# Patient Record
Sex: MALE | Race: ASIAN | NOT HISPANIC OR LATINO | Employment: UNEMPLOYED | URBAN - METROPOLITAN AREA
[De-identification: names, ages, dates, MRNs, and addresses within clinical notes are randomized per-mention and may not be internally consistent; named-entity substitution may affect disease eponyms.]

---

## 2021-06-23 ENCOUNTER — HOSPITAL ENCOUNTER (EMERGENCY)
Dept: EMERGENCY MEDICINE | Facility: HOSPITAL | Age: 31
Discharge: HOME | End: 2021-06-23
Attending: STUDENT IN AN ORGANIZED HEALTH CARE EDUCATION/TRAINING PROGRAM

## 2021-06-23 DIAGNOSIS — K29.70 GASTRITIS WITHOUT BLEEDING, UNSPECIFIED CHRONICITY, UNSPECIFIED GASTRITIS TYPE: ICD-10-CM

## 2021-06-23 LAB
ALBUMIN SERPL-MCNC: 4.6 G/DL (ref 3.5–5)
ALP SERPL-CCNC: 58 U/L (ref 45–120)
ALT SERPL W P-5'-P-CCNC: 28 U/L (ref 0–45)
ANION GAP SERPL CALCULATED.3IONS-SCNC: 9 MMOL/L (ref 5–18)
AST SERPL W P-5'-P-CCNC: 23 U/L (ref 0–40)
BASOPHILS # BLD AUTO: 0.1 THOU/UL (ref 0–0.2)
BASOPHILS NFR BLD AUTO: 1 % (ref 0–2)
BILIRUB SERPL-MCNC: 1 MG/DL (ref 0–1)
BUN SERPL-MCNC: 12 MG/DL (ref 8–22)
CALCIUM SERPL-MCNC: 9.2 MG/DL (ref 8.5–10.5)
CHLORIDE BLD-SCNC: 104 MMOL/L (ref 98–107)
CO2 SERPL-SCNC: 28 MMOL/L (ref 22–31)
CREAT SERPL-MCNC: 0.78 MG/DL (ref 0.7–1.3)
EOSINOPHIL # BLD AUTO: 0.4 THOU/UL (ref 0–0.4)
EOSINOPHIL NFR BLD AUTO: 6 % (ref 0–6)
ERYTHROCYTE [DISTWIDTH] IN BLOOD BY AUTOMATED COUNT: 12.3 % (ref 11–14.5)
GFR SERPL CREATININE-BSD FRML MDRD: >60 ML/MIN/1.73M2
GLUCOSE BLD-MCNC: 86 MG/DL (ref 70–125)
HCT VFR BLD AUTO: 41.1 % (ref 40–54)
HGB BLD-MCNC: 13.5 G/DL (ref 14–18)
IMM GRANULOCYTES # BLD: 0 THOU/UL
IMM GRANULOCYTES NFR BLD: 0 %
LIPASE SERPL-CCNC: 41 U/L (ref 0–52)
LYMPHOCYTES # BLD AUTO: 2.5 THOU/UL (ref 0.8–4.4)
LYMPHOCYTES NFR BLD AUTO: 35 % (ref 20–40)
MCH RBC QN AUTO: 30.8 PG (ref 27–34)
MCHC RBC AUTO-ENTMCNC: 32.8 G/DL (ref 32–36)
MCV RBC AUTO: 94 FL (ref 80–100)
MONOCYTES # BLD AUTO: 0.6 THOU/UL (ref 0–0.9)
MONOCYTES NFR BLD AUTO: 8 % (ref 2–10)
NEUTROPHILS # BLD AUTO: 3.5 THOU/UL (ref 2–7.7)
NEUTROPHILS NFR BLD AUTO: 50 % (ref 50–70)
PLATELET # BLD AUTO: 287 THOU/UL (ref 140–440)
PMV BLD AUTO: 9.7 FL (ref 8.5–12.5)
POTASSIUM BLD-SCNC: 3.7 MMOL/L (ref 3.5–5)
PROT SERPL-MCNC: 8.2 G/DL (ref 6–8)
RBC # BLD AUTO: 4.38 MILL/UL (ref 4.4–6.2)
SODIUM SERPL-SCNC: 141 MMOL/L (ref 136–145)
WBC: 7.1 THOU/UL (ref 4–11)

## 2021-06-23 RX ORDER — SUCRALFATE ORAL 1 G/10ML
1 SUSPENSION ORAL 2 TIMES DAILY
Qty: 414 ML | Refills: 0 | Status: SHIPPED | OUTPATIENT
Start: 2021-06-23

## 2021-06-25 NOTE — ED TRIAGE NOTES
Patient arrives by private car for evaluation of intermittent abdominal pain that radiates to his back.  Patient states that he had endoscopy and pain was good for a while, but has returned.

## 2021-06-25 NOTE — ED NOTES
Pt states that he has been spitting up dark blood when he first wakes up in the morning. That was happening with the pain in abdomen and back before. Pt states that it happened again this AM.

## 2021-06-26 ENCOUNTER — HOSPITAL ENCOUNTER (EMERGENCY)
Dept: EMERGENCY MEDICINE | Facility: HOSPITAL | Age: 31
Discharge: HOME | End: 2021-06-26
Attending: STUDENT IN AN ORGANIZED HEALTH CARE EDUCATION/TRAINING PROGRAM

## 2021-06-26 DIAGNOSIS — Z11.3 SCREEN FOR STD (SEXUALLY TRANSMITTED DISEASE): ICD-10-CM

## 2021-06-27 LAB — HIV 1+2 AB+HIV1 P24 AG SERPL QL IA: NEGATIVE

## 2021-06-28 LAB
C TRACH DNA SPEC QL NAA+PROBE: NEGATIVE
N GONORRHOEA DNA SPEC QL NAA+PROBE: NEGATIVE
SPEC DESCRIPTION: NORMAL
SPECIMEN DESCRIPTION: NORMAL

## 2021-07-04 NOTE — ED PROVIDER NOTES
ED Provider Notes by Yobany Melendez MD at 2021  7:52 AM     Author: Yobany Melendez MD Service: Emergency Medicine Author Type: Physician    Filed: 2021  5:40 PM Date of Service: 2021  7:52 AM Status: Signed    : Yobany Melendez MD (Physician)       EMERGENCY DEPARTMENT ENCOUNTER      NAME: Avril Wiseman  AGE: 30 y.o. male  YOB: 1990  MRN: [id number]  EVALUATION DATE & TIME: 2021  7:41 AM    PCP: Provider, No Primary Care    ED PROVIDER: Yobany Melendez M.D.      Chief Complaint   Patient presents with   ? Abdominal Pain         FINAL IMPRESSION:  1. Gastritis without bleeding, unspecified chronicity, unspecified gastritis type          ED COURSE & MEDICAL DECISION MAKIN:59 AM Met with patient for initial interview and exam. Discussed initial plan for care for their stay in the emergency department.         Pertinent Labs & Imaging studies reviewed. (See chart for details)  30 y.o. male presents to the Emergency Department for evaluation of one episode of coughing up some blood and ongoing epigastric pain.  Patient has long history of gastritis GERD and he even recently had an endoscopy which showed this.  Patient has had no continued bleeding here in the emergency department.  Blood work is normal.  Chest x-ray is normal.  Did not believe this represented any sort of intra-abdominal pathology.  No evidence of ACS.  Clearly this is the same symptoms the patient has had recently.  With normal blood work and the symptoms have not improved with some GI cocktail will discharge patient with Carafate and have him follow-up with his primary doctor as he has previously.    At the conclusion of the encounter I discussed the results of all of the tests and the disposition. The questions were answered. The patient or family acknowledged understanding and was agreeable with the care plan.           MEDICATIONS GIVEN IN THE EMERGENCY:  Medications   aluminum-magnesium  hydroxide-simethicone 15 mL, viscous lidocaine HC 15 mL (GI COCKTAIL) (30 mL Oral Given 6/23/21 0821)       NEW PRESCRIPTIONS STARTED AT TODAY'S ER VISIT  Discharge Medication List as of 6/23/2021 10:01 AM      START taking these medications    Details   sucralfate (CARAFATE) 100 mg/mL suspension Take 10 mL (1 g total) by mouth 2 (two) times a day., Starting Wed 6/23/2021, Print                =================================================================    HPI    Patient information was obtained from: Patient    Use of : Yes (MARIELLE) - Language: Radha Wiseman is a 30 y.o. male with a pertinent history of GERD who presents to this ED by walk in for evaluation of abdominal pain.    Patient reports ongoing abdominal pain for the past month. He describes the pain as a burning sensation that radiates to his back. Patient states that he spit up blood this morning, which is what encouraged him to come to the ED. He also endorses lightheadedness, dizziness, headache, weakness, presyncope, and a sore throat. He states that these are the same symptoms that he has experienced before. Patient denies any other complaints at this time.     REVIEW OF SYSTEMS   Review of Systems   HENT: Positive for sore throat.    Gastrointestinal: Positive for abdominal pain.        Positive for hemoptysis   Neurological: Positive for dizziness, light-headedness and headaches.        Positive for presyncope   All other systems reviewed and are negative.    PAST MEDICAL HISTORY:  History reviewed. No pertinent past medical history.    PAST SURGICAL HISTORY:  History reviewed. No pertinent surgical history.        CURRENT MEDICATIONS:    No current facility-administered medications on file prior to encounter.      No current outpatient medications on file prior to encounter.       ALLERGIES:  No Known Allergies    FAMILY HISTORY:  History reviewed. No pertinent family history.    SOCIAL HISTORY:   Social History      Socioeconomic History   ? Marital status:      Spouse name: None   ? Number of children: None   ? Years of education: None   ? Highest education level: None   Occupational History   ? None   Social Needs   ? Financial resource strain: None   ? Food insecurity     Worry: None     Inability: None   ? Transportation needs     Medical: None     Non-medical: None   Tobacco Use   ? Smoking status: None   Substance and Sexual Activity   ? Alcohol use: None   ? Drug use: None   ? Sexual activity: None   Lifestyle   ? Physical activity     Days per week: None     Minutes per session: None   ? Stress: None   Relationships   ? Social connections     Talks on phone: None     Gets together: None     Attends [employer] service: None     Active member of club or organization: None     Attends meetings of clubs or organizations: None     Relationship status: None   ? Intimate partner violence     Fear of current or ex partner: None     Emotionally abused: None     Physically abused: None     Forced sexual activity: None   Other Topics Concern   ? None   Social History Narrative   ? None       VITALS:  No data found.     PHYSICAL EXAM    Constitutional: Well developed, Well nourished, NAD, GCS 15  HENT: Normocephalic, Atraumatic, Bilateral external ears normal, Oropharynx normal, mucous membranes moist, Nose normal. Neck-  Normal range of motion, No tenderness, Supple, No stridor.  Eyes: PERRL, EOMI, Conjunctiva normal, No discharge.   Respiratory: Normal breath sounds, No respiratory distress, No wheezing, Speaks full sentences easily. No cough.  Cardiovascular: Normal heart rate, Regular rhythm, No murmurs, No rubs, No gallops. Chest wall nontender.  GI:Soft, No tenderness, No masses, No flank tenderness. No rebound or guarding.    Musculoskeletal: 2+ DP pulses. No edema.No cyanosis, No clubbing. Good range of motion in all major joints. No tenderness to palpation or major deformities noted.   Integument: Warm, Dry, No  erythema, No rash. No petechiae.   Neurologic: Alert & oriented x 3,  CN 3-12 intact Normal motor function, Normal sensory function, No focal deficits noted. Normal gait. Normal finger to nose bilaterally  Psychiatric: Affect normal, Judgment normal, Mood normal. Cooperative.     LAB:  All pertinent labs reviewed and interpreted.  Results for orders placed or performed during the hospital encounter of 06/23/21   Comprehensive Metabolic Panel   Result Value Ref Range    Sodium 141 136 - 145 mmol/L    Potassium 3.7 3.5 - 5.0 mmol/L    Chloride 104 98 - 107 mmol/L    CO2 28 22 - 31 mmol/L    Anion Gap, Calculation 9 5 - 18 mmol/L    Glucose 86 70 - 125 mg/dL    BUN 12 8 - 22 mg/dL    Creatinine 0.78 0.70 - 1.30 mg/dL    GFR MDRD Af Amer >60 >60 mL/min/1.73m2    GFR MDRD Non Af Amer >60 >60 mL/min/1.73m2    Bilirubin, Total 1.0 0.0 - 1.0 mg/dL    Calcium 9.2 8.5 - 10.5 mg/dL    Protein, Total 8.2 (H) 6.0 - 8.0 g/dL    Albumin 4.6 3.5 - 5.0 g/dL    Alkaline Phosphatase 58 45 - 120 U/L    AST 23 0 - 40 U/L    ALT 28 0 - 45 U/L   Lipase   Result Value Ref Range    Lipase 41 0 - 52 U/L   HM1 (CBC with Diff)   Result Value Ref Range    WBC 7.1 4.0 - 11.0 thou/uL    RBC 4.38 (L) 4.40 - 6.20 mill/uL    Hemoglobin 13.5 (L) 14.0 - 18.0 g/dL    Hematocrit 41.1 40.0 - 54.0 %    MCV 94 80 - 100 fL    MCH 30.8 27.0 - 34.0 pg    MCHC 32.8 32.0 - 36.0 g/dL    RDW 12.3 11.0 - 14.5 %    Platelets 287 140 - 440 thou/uL    MPV 9.7 8.5 - 12.5 fL    Neutrophils % 50 50 - 70 %    Lymphocytes % 35 20 - 40 %    Monocytes % 8 2 - 10 %    Eosinophils % 6 0 - 6 %    Basophils % 1 0 - 2 %    Immature Granulocyte % 0 <=0 %    Neutrophils Absolute 3.5 2.0 - 7.7 thou/uL    Lymphocytes Absolute 2.5 0.8 - 4.4 thou/uL    Monocytes Absolute 0.6 0.0 - 0.9 thou/uL    Eosinophils Absolute 0.4 0.0 - 0.4 thou/uL    Basophils Absolute 0.1 0.0 - 0.2 thou/uL    Immature Granulocyte Absolute 0.0 <=0.0 thou/uL       RADIOLOGY:  Reviewed all pertinent imaging.  Please see official radiology report.  No results found.    I, Amie Townsend, am serving as a scribe to document services personally performed by Dr. Melendez based on my observation and the provider's statements to me. I, Yobany Melendez MD attest that Amie Townsend is acting in a scribe capacity, has observed my performance of the services and has documented them in accordance with my direction.    Yobany Melendez M.D.  Emergency Medicine  [facility] EMERGENCY DEPARTMENT  [address]  [facility] [address]  Dept: [phone]  Loc: [phone]     Yobany Melendez MD  07/01/21 3231

## 2021-07-06 VITALS — WEIGHT: 135 LBS

## 2021-07-07 NOTE — ED PROVIDER NOTES
"EMERGENCY DEPARTMENT ENCOUNTER       ED Course & Medical Decision Making     11:03 PM I met with the patient, obtained history, performed an initial exam, and discussed options and plan for diagnostics and treatment here in the ED. PPE: Provider wore gloves, N95 mask, eye protection    11:20 PM We discussed plans for discharge including supportive cares, symptomatic treatment, outpatient follow up, and reasons to return to the emergency department.     Final Impression  30 y.o. male initially presented for \"abdominal pain\". Had an upper endoscopy on 6/3, negative for H. pylori, normal esophagus, some mild erythematous mucosa in the gastric body and antrum suggestive of chronic gastritis, but appears that it was otherwise unremarkable, no masses or ulcers.  Currently on 20 mg omeprazole once daily.  Was recently seen here on 6/23, had a fairly benign work-up, was ultimately discharged with Carafate.  Patient states that he takes \"1 medication per day\" for his stomach pain, though unclear if he is taking the omeprazole or the Carafate.  On description of his pain he describes it as a burning in the skin of his back and his abdomen intermittently.  No signs of rashes or other skin changes on exam of abdomen and back.  On further discussion patient explains that he had a friend who had similar symptoms that was later found to have an STD, states he would like to be tested for STDs including HIV.  Denies penile discharge, drainage, dysuria, or rashes in his genital region.  Denies high risk sexual activity.  Would not treat empirically based on this history and lack of other symptoms.  Does not actually have any abdominal pain on my exam.  Will send gonorrhea and chlamydia, as well as HIV.  We will contact patient if treatment or intervention is required.  Patient agreeable to plan.  Will discharge home.    Prior to making a final disposition on this patient the results of patient's tests and other diagnostic studies " were discussed with the patient. All questions were answered. Patient expressed understanding of the plan and was amenable to it.    Medications - No data to display    Final Impression     1. Screen for STD (sexually transmitted disease)        Chief Complaint     Chief Complaint   Patient presents with     Abdominal Pain       Pt comes in with complaints of generalized abdominal pain. States that that pain is like when he was recently here. Pt has past diagnosis of gastritis. No trauma. Denies blood in stool.       HPI     Avril Wiseman is a 30 y.o. male who presents for evaluation of abdominal pain.    Per chart review, patient was seen in this ED on 6/23/2021 for evaluation of abdominal pain and hemoptysis. Chest x-ray was negative. He was discharged home with prescription for Carafate.     Patient reports that he is having abdominal that radiates around into his mid to low back. He has recently been seen for gastritis and he states that he is taking his omeprazole as prescribed. He notes that his current pain in his abdomen feels like burning pain under his skin rather than in his stomach. He states that a friend of his recently had similar pain was diagnosed with an STD. Patient expresses concern for an STD and he is requesting to be tested despite denying sexual relations with an individual who has tested positive. He denies rashes, penile discharge, or other complaints at this time.        I, Loren Kc, am serving as a scribe to document services personally performed by Dr. Noam Zacarias MD, based on my observation and the provider's statements to me. I, Dr. Noam Zacarias MD attest that Loren Kc is acting in a scribe capacity, has observed my performance of the services and has documented them in accordance with my direction.    Past Medical History     No past medical history on file.  Relevant past surgical history reviewed with patient, unless otherwise stated in HPI, history not pertinent  to this visit.  No family history on file.   Social History     Tobacco Use     Smoking status: Not on file   Substance Use Topics     Alcohol use: Not on file     Drug use: Not on file       Relevant past medical, surgical, family and social history as documented above, has been reviewed and discussed with patient. No changes or additions, unless otherwise noted in the HPI.    Current Medications     Current Discharge Medication List      CONTINUE these medications which have NOT CHANGED    Details   sucralfate (CARAFATE) 100 mg/mL suspension Take 10 mL (1 g total) by mouth 2 (two) times a day.  Qty: 414 mL, Refills: 0    Associated Diagnoses: Gastritis without bleeding, unspecified chronicity, unspecified gastritis type             Allergies     No Known Allergies    Review of Systems     Constitutional: Denies fever, chills, unintentional weight loss or fatigue   HENT: Denies sore throat  Respiratory: Denies shortness of breath    Cardiovascular: Denies chest pain  GI: Denies nausea, vomiting. Endorses intermittent burning epigastric pain  : Denies dysuria, hematuria, flank pain.  Denies pain in his genitals, penile discharge, or rashes in his groin.  Skin: Denies rashes  Neurologic: Denies current headache, new weakness, focal weakness, or sensory changes      Remainder of systems reviewed, unless noted in HPI all others negative.    Physical Exam     /88 (Patient Position: Sitting)   Pulse 74   Temp 98.4  F (36.9  C) (Oral)   Resp 16   Wt 135 lb (61.2 kg)   SpO2 98%   Constitutional: Awake, alert, in no acute distress  Head: Normocephalic, atraumatic.  ENT: Mucous membranes moist.   Eyes: PERRL, EOMI, Conjunctiva normal  Respiratory: Respirations even, unlabored. Lungs clear to ascultation bilaterally, in no acute respiratory distress.  Cardiovascular: Regular rate and rhythm. +2 radial pulses, equal bilaterally. No pitting edema.   GI: Abdomen soft, non-tender to palpation in all 4 quadrants. No  guarding or rebound. Bowel sounds intact on all 4 quadrants.   : No CVA tenderness.    Musculoskeletal: Moves all 4 extremities equally, strength symmetrical on bilateral uppers and lowers.  Integument: Warm, dry.  No rashes on back or abdomen.  Neurologic: Alert & oriented x 3. Normal speech. Grossly normal motor and sensory function. No focal deficits noted.  Psychiatric: Normal mood and affect.      Noam Zacarias MD  06/26/21 3357

## 2021-07-07 NOTE — ED TRIAGE NOTES
Pt comes in with complaints of generalized abdominal pain. States that that pain is like when he was recently here. Pt has past diagnosis of gastritis. No trauma. Denies blood in stool.

## 2023-02-07 ENCOUNTER — HOSPITAL ENCOUNTER (EMERGENCY)
Facility: HOSPITAL | Age: 33
Discharge: HOME | End: 2023-02-07
Attending: EMERGENCY MEDICINE | Admitting: EMERGENCY MEDICINE
Payer: COMMERCIAL

## 2023-02-07 ENCOUNTER — APPOINTMENT (OUTPATIENT)
Dept: RADIOLOGY | Facility: HOSPITAL | Age: 33
End: 2023-02-07
Attending: EMERGENCY MEDICINE
Payer: COMMERCIAL

## 2023-02-07 VITALS
DIASTOLIC BLOOD PRESSURE: 65 MMHG | SYSTOLIC BLOOD PRESSURE: 103 MMHG | OXYGEN SATURATION: 97 % | TEMPERATURE: 98.9 F | HEART RATE: 68 BPM | RESPIRATION RATE: 16 BRPM | WEIGHT: 140 LBS

## 2023-02-07 DIAGNOSIS — R07.9 CHEST PAIN, UNSPECIFIED TYPE: ICD-10-CM

## 2023-02-07 DIAGNOSIS — R20.2 PARESTHESIAS: ICD-10-CM

## 2023-02-07 LAB
ALBUMIN SERPL BCG-MCNC: 4.7 G/DL (ref 3.5–5.2)
ALP SERPL-CCNC: 78 U/L (ref 40–129)
ALT SERPL W P-5'-P-CCNC: 18 U/L (ref 10–50)
ANION GAP SERPL CALCULATED.3IONS-SCNC: 12 MMOL/L (ref 7–15)
AST SERPL W P-5'-P-CCNC: 21 U/L (ref 10–50)
BASOPHILS # BLD AUTO: 0.1 10E3/UL (ref 0–0.2)
BASOPHILS NFR BLD AUTO: 1 %
BILIRUB DIRECT SERPL-MCNC: <0.2 MG/DL (ref 0–0.3)
BILIRUB SERPL-MCNC: 0.4 MG/DL
BUN SERPL-MCNC: 14.7 MG/DL (ref 6–20)
CALCIUM SERPL-MCNC: 9.6 MG/DL (ref 8.6–10)
CHLORIDE SERPL-SCNC: 101 MMOL/L (ref 98–107)
CREAT SERPL-MCNC: 0.85 MG/DL (ref 0.67–1.17)
D DIMER PPP FEU-MCNC: <0.27 UG/ML FEU (ref 0–0.5)
DEPRECATED HCO3 PLAS-SCNC: 26 MMOL/L (ref 22–29)
EOSINOPHIL # BLD AUTO: 0.3 10E3/UL (ref 0–0.7)
EOSINOPHIL NFR BLD AUTO: 3 %
ERYTHROCYTE [DISTWIDTH] IN BLOOD BY AUTOMATED COUNT: 11.3 % (ref 10–15)
FLUAV RNA SPEC QL NAA+PROBE: NEGATIVE
FLUBV RNA RESP QL NAA+PROBE: NEGATIVE
GFR SERPL CREATININE-BSD FRML MDRD: >90 ML/MIN/1.73M2
GLUCOSE SERPL-MCNC: 101 MG/DL (ref 70–99)
HCT VFR BLD AUTO: 42.1 % (ref 40–53)
HGB BLD-MCNC: 14.5 G/DL (ref 13.3–17.7)
HOLD SPECIMEN: NORMAL
HOLD SPECIMEN: NORMAL
IMM GRANULOCYTES # BLD: 0 10E3/UL
IMM GRANULOCYTES NFR BLD: 0 %
LYMPHOCYTES # BLD AUTO: 2 10E3/UL (ref 0.8–5.3)
LYMPHOCYTES NFR BLD AUTO: 22 %
MCH RBC QN AUTO: 30.5 PG (ref 26.5–33)
MCHC RBC AUTO-ENTMCNC: 34.4 G/DL (ref 31.5–36.5)
MCV RBC AUTO: 89 FL (ref 78–100)
MONOCYTES # BLD AUTO: 0.7 10E3/UL (ref 0–1.3)
MONOCYTES NFR BLD AUTO: 8 %
NEUTROPHILS # BLD AUTO: 6.1 10E3/UL (ref 1.6–8.3)
NEUTROPHILS NFR BLD AUTO: 66 %
NRBC # BLD AUTO: 0 10E3/UL
NRBC BLD AUTO-RTO: 0 /100
PLATELET # BLD AUTO: 286 10E3/UL (ref 150–450)
POTASSIUM SERPL-SCNC: 4 MMOL/L (ref 3.4–5.3)
PROT SERPL-MCNC: 8.5 G/DL (ref 6.4–8.3)
RBC # BLD AUTO: 4.75 10E6/UL (ref 4.4–5.9)
RSV RNA SPEC NAA+PROBE: NEGATIVE
SARS-COV-2 RNA RESP QL NAA+PROBE: NEGATIVE
SODIUM SERPL-SCNC: 139 MMOL/L (ref 136–145)
TROPONIN T SERPL HS-MCNC: <6 NG/L
WBC # BLD AUTO: 9.1 10E3/UL (ref 4–11)

## 2023-02-07 PROCEDURE — 82248 BILIRUBIN DIRECT: CPT | Performed by: EMERGENCY MEDICINE

## 2023-02-07 PROCEDURE — 84484 ASSAY OF TROPONIN QUANT: CPT | Performed by: EMERGENCY MEDICINE

## 2023-02-07 PROCEDURE — 82310 ASSAY OF CALCIUM: CPT | Performed by: EMERGENCY MEDICINE

## 2023-02-07 PROCEDURE — 71045 X-RAY EXAM CHEST 1 VIEW: CPT

## 2023-02-07 PROCEDURE — 85004 AUTOMATED DIFF WBC COUNT: CPT | Performed by: EMERGENCY MEDICINE

## 2023-02-07 PROCEDURE — C9803 HOPD COVID-19 SPEC COLLECT: HCPCS

## 2023-02-07 PROCEDURE — 93005 ELECTROCARDIOGRAM TRACING: CPT | Performed by: EMERGENCY MEDICINE

## 2023-02-07 PROCEDURE — 36415 COLL VENOUS BLD VENIPUNCTURE: CPT | Performed by: EMERGENCY MEDICINE

## 2023-02-07 PROCEDURE — 87637 SARSCOV2&INF A&B&RSV AMP PRB: CPT | Performed by: EMERGENCY MEDICINE

## 2023-02-07 PROCEDURE — 85379 FIBRIN DEGRADATION QUANT: CPT | Performed by: EMERGENCY MEDICINE

## 2023-02-07 PROCEDURE — 99285 EMERGENCY DEPT VISIT HI MDM: CPT | Mod: 25,CS

## 2023-02-07 ASSESSMENT — ENCOUNTER SYMPTOMS
BACK PAIN: 1
SHORTNESS OF BREATH: 1
DIARRHEA: 1

## 2023-02-07 ASSESSMENT — ACTIVITIES OF DAILY LIVING (ADL)
ADLS_ACUITY_SCORE: 35
ADLS_ACUITY_SCORE: 35

## 2023-02-07 NOTE — ED TRIAGE NOTES
Burning in his back when he takes a deep breath. Intermittent chest tightness with mild shortness of breath. Intermittent cough. Symptoms for about 2-3 weeks. Dizziness when turning head from side to side. No fevers. No pain reliever.    Radha  needed.

## 2023-02-08 NOTE — DISCHARGE INSTRUCTIONS
As we discussed your testing today did not reveal an explanation for your symptoms.  Based on you having symptoms for 1 year recommend you follow-up with your primary care doctor who can perform additional test.  Return to the ER for worsening

## 2023-02-08 NOTE — ED PROVIDER NOTES
EMERGENCY DEPARTMENT ENCOUNTER      NAME: Avril Wiseman  AGE: 32 year old male  YOB: 1990  MRN: [id number]  EVALUATION DATE & TIME: 2/7/2023  4:21 PM    PCP: [facility]    ED PROVIDER: Jeff Fung MD        Chief Complaint   Patient presents with     Shortness of Breath         FINAL IMPRESSION:  1. Chest pain, unspecified type    2. Paresthesias          ED COURSE & MEDICAL DECISION MAKING:    Pertinent Labs & Imaging studies reviewed. (See chart for details)  32 year old male presents to the Emergency Department for evaluation of chronic bilateral tingling in the legs.  Couple weeks of pleuritic chest pain    I did review patient's [facility] record he had extensive work-up for myriad of symptoms      ED Course as of 02/07/23 2046 Tue Feb 07, 2023 1859 Patient presents with 2 to 3 weeks of pleuritic chest pain.  Also reports 1 year of bilateral leg tingling.  Per chart review at [facility] has had normal A1c's and thyroid screenings.  Also reports chronic loose stools.  He has had stool cultures and O&P's previously.   1925 On exam he has no muscle weakness.  Cauda equina or discitis unlikely.  Possible peripheral neuropathy but based on his history and exam needs further testing with his primary care doctor.   1925 Differential includes ACS, pulmonary emboli, pneumonia, aortic dissection, esophageal rupture, pneumothorax, pneumonia, malignancy, pleurisy, shingles, and GERD.  Based on history and examination pulmonary emboli and aortic dissection unlikely.      1925 Plan for COVID-19, CBC, BMP, D-dimer, troponin, LFTs   1925 Chest x-ray negative   1926 Unclear etiology of patient's symptoms.  Reassuring exam.  Recheck with primary care doctor.   1926 WBC: 9.1   1926 Influenza A: Negative   1926 Resp Syncytial Virus: Negative   1926 SARS CoV2 PCR: Negative   1926 Sodium: 139   1926 D-Dimer Quantitative: <0.27   1926 Protein Total(!): 8.5  Unclear etiology   1926  "ALT: 18   1926 AST: 21 1926 Bilirubin Direct: <0.20   1926 Troponin T, High Sensitivity: <6  Delta troponin not indicated     Normal neuro and back exam.  Highly doubt Guillain-Barré, transverse myelitis, stroke.  Based on duration of symptoms recommend he follow-up with primary care doctor    7:12 PM Introduced myself to the patient, obtained history of present illness, and performed initial physical exam at this time. PPE: Provider wore gloves, N95 mask. Conducted back exam.  7:24 PM Discussed plan of discharge, patient is agreeable    Medical Decision Making    History:    Supplemental history from: Documented in chart, if applicable    External Record(s) reviewed: Documented in chart, if applicable.    Work Up:    Chart documentation includes differential considered and any EKGs or imaging independently interpreted by provider, where specified.    In additional to work up documented, I considered the following work up: Documented in chart, if applicable.    External consultation:    Discussion of management with another provider: Documented in chart, if applicable    Complicating factors:    Care impacted by chronic illness: N/A    Care affected by social determinants of health: Access to Medical Care    Disposition considerations: Discharge. No recommendations on prescription strength medication(s). N/A.            At the conclusion of the encounter I discussed the results of all of the tests and the disposition. The questions were answered. The patient or family acknowledged understanding and was agreeable with the care plan.       MEDICATIONS GIVEN IN THE EMERGENCY:  Medications - No data to display    NEW PRESCRIPTIONS STARTED AT TODAY'S ER VISIT  Discharge Medication List as of 2/7/2023  7:49 PM             =================================================================    HPI    Triage note  \"    Burning in his back when he takes a deep breath. Intermittent chest tightness with mild shortness of " "breath. Intermittent cough. Symptoms for about 2-3 weeks. Dizziness when turning head from side to side. No fevers. No pain reliever.    Radha  needed.   \"      Patient information was obtained from: Patient    Use of :  Yes (Phone) - Language Radha Wiseman is a 32 year old male with a pertinent history of chronic bilateral low back pain with bilateral sciatica and GERD who presents to this ED personal vehicle for evaluation of back pain radiating into legs.    Per patient chart review, the patient had a 21 ng/ml Vitamin D 25-hydroxy level and elevated cholesterol, LDL, and Non HDL chol levels on 11/05/2022 at [facility]    The patient reports that he has been dealing with chronic back pain, described as \"burning\", that radiates down both legs and into his ribs. He states laying down or sitting worsens the pain.  He also endorses occasional shortness of breath with the pain. He further endorses fatigue and diarrhea.      Patient states he came into the ED for this issue 1-2 years ago.       REVIEW OF SYSTEMS   Review of Systems   Respiratory: Positive for shortness of breath.    Gastrointestinal: Positive for diarrhea.   Musculoskeletal: Positive for back pain (burning, radiating into both legs ).       PAST MEDICAL HISTORY:  No past medical history on file.    PAST SURGICAL HISTORY:  No past surgical history on file.        CURRENT MEDICATIONS:    sucralfate (CARAFATE) 100 mg/mL suspension        ALLERGIES:  No Known Allergies    FAMILY HISTORY:  No family history on file.    SOCIAL HISTORY:   Social History     Socioeconomic History     Marital status:        VITALS:  /65   Pulse 68   Temp 98.9  F (37.2  C) (Temporal)   Resp 16   Wt 63.5 kg (140 lb)   SpO2 97%     PHYSICAL EXAM      Vitals: /65   Pulse 68   Temp 98.9  F (37.2  C) (Temporal)   Resp 16   Wt 63.5 kg (140 lb)   SpO2 97%   General: Appears in no acute distress, awake, alert, " interactive.  Eyes: Conjunctivae non-injected. Sclera anicteric.  HENT: Atraumatic.  Neck: Supple.  Respiratory/Chest: Respiration unlabored. No wheeze  Heart: No murmurs  Abdomen: non distended  Musculoskeletal: Normal extremities. No edema or erythema.   Normal gait, able to stand on toes and heels, normal bilateral ankle dorsiflexion/plantar flexion/inversion/eversion/1st toe extension, normal bilateral patellar and Achilles reflexes. No midline spine tenderness. No step offs. No crepitus. No visible deformity to spine. No pulsatile abdominal masses. Light touch sensation intact to LE.   Skin: Normal color. No rash or diaphoresis.  Neurologic: Face symmetric, moves all extremities spontaneously. Speech clear.  Psychiatric: Oriented to person, place, and time. Affect appropriate.      .       LAB:  All pertinent labs reviewed and interpreted.  Results for orders placed or performed during the hospital encounter of 02/07/23   XR Chest Port 1 View    Impression    IMPRESSION:     Heart size is normal. Lungs are clear bilaterally. Mediastinum and visualized bony structures are unremarkable.   Basic metabolic panel   Result Value Ref Range    Sodium 139 136 - 145 mmol/L    Potassium 4.0 3.4 - 5.3 mmol/L    Chloride 101 98 - 107 mmol/L    Carbon Dioxide (CO2) 26 22 - 29 mmol/L    Anion Gap 12 7 - 15 mmol/L    Urea Nitrogen 14.7 6.0 - 20.0 mg/dL    Creatinine 0.85 0.67 - 1.17 mg/dL    Calcium 9.6 8.6 - 10.0 mg/dL    Glucose 101 (H) 70 - 99 mg/dL    GFR Estimate >90 >60 mL/min/1.73m2   CBC with platelets and differential   Result Value Ref Range    WBC Count 9.1 4.0 - 11.0 10e3/uL    RBC Count 4.75 4.40 - 5.90 10e6/uL    Hemoglobin 14.5 13.3 - 17.7 g/dL    Hematocrit 42.1 40.0 - 53.0 %    MCV 89 78 - 100 fL    MCH 30.5 26.5 - 33.0 pg    MCHC 34.4 31.5 - 36.5 g/dL    RDW 11.3 10.0 - 15.0 %    Platelet Count 286 150 - 450 10e3/uL    % Neutrophils 66 %    % Lymphocytes 22 %    % Monocytes 8 %    % Eosinophils 3 %    %  Basophils 1 %    % Immature Granulocytes 0 %    NRBCs per 100 WBC 0 <1 /100    Absolute Neutrophils 6.1 1.6 - 8.3 10e3/uL    Absolute Lymphocytes 2.0 0.8 - 5.3 10e3/uL    Absolute Monocytes 0.7 0.0 - 1.3 10e3/uL    Absolute Eosinophils 0.3 0.0 - 0.7 10e3/uL    Absolute Basophils 0.1 0.0 - 0.2 10e3/uL    Absolute Immature Granulocytes 0.0 <=0.4 10e3/uL    Absolute NRBCs 0.0 10e3/uL   Extra Blue Top Tube   Result Value Ref Range    Hold Specimen JIC    Extra Red Top Tube   Result Value Ref Range    Hold Specimen JIC    Troponin T, High Sensitivity (now)   Result Value Ref Range    Troponin T, High Sensitivity <6 <=22 ng/L   Symptomatic Influenza A/B & SARS-CoV2 (COVID-19) Virus PCR Multiplex Nasopharyngeal    Specimen: Nasopharyngeal; Swab   Result Value Ref Range    Influenza A PCR Negative Negative    Influenza B PCR Negative Negative    RSV PCR Negative Negative    SARS CoV2 PCR Negative Negative   Hepatic function panel   Result Value Ref Range    Protein Total 8.5 (H) 6.4 - 8.3 g/dL    Albumin 4.7 3.5 - 5.2 g/dL    Bilirubin Total 0.4 <=1.2 mg/dL    Alkaline Phosphatase 78 40 - 129 U/L    AST 21 10 - 50 U/L    ALT 18 10 - 50 U/L    Bilirubin Direct <0.20 0.00 - 0.30 mg/dL   D dimer quantitative   Result Value Ref Range    D-Dimer Quantitative <0.27 0.00 - 0.50 ug/mL FEU       RADIOLOGY:  Reviewed all pertinent imaging. Please see official radiology report.  XR Chest Port 1 View   Final Result   IMPRESSION:       Heart size is normal. Lungs are clear bilaterally. Mediastinum and visualized bony structures are unremarkable.          EKG:    Performed at: 1638    Impression: Poor data quality, interpretation may be adversely affected.   Normal sinus rhythm      Rate: 80  Rhythm: Sinus rhythm  Axis: 78  ND Interval: 176  QRS Interval: 102  QTc Interval: 435  ST Changes: None  Comparison: No prior    I have independently reviewed and interpreted the EKG(s) documented above.          Flaco WILEY, am serving as a  scribe to document services personally performed by Anthony Fung MD based on my observation and the provider's statements to me. I, Dr. Anthony Fung, attest that Flaco Love is acting in a scribe capacity, has observed my performance of the services and has documented them in accordance with my direction.    Anthony Fung MD  Emergency Medicine  [facility] EMERGENCY DEPARTMENT  [address] [place] [id number] [phone]       Anthony Fung MD  02/07/23 2587